# Patient Record
Sex: MALE | Race: BLACK OR AFRICAN AMERICAN | Employment: UNEMPLOYED | URBAN - METROPOLITAN AREA
[De-identification: names, ages, dates, MRNs, and addresses within clinical notes are randomized per-mention and may not be internally consistent; named-entity substitution may affect disease eponyms.]

---

## 2021-05-20 ENCOUNTER — HOSPITAL ENCOUNTER (EMERGENCY)
Age: 41
Discharge: HOME | End: 2021-05-20
Attending: EMERGENCY MEDICINE | Admitting: EMERGENCY MEDICINE

## 2021-05-20 VITALS
OXYGEN SATURATION: 100 % | WEIGHT: 196 LBS | BODY MASS INDEX: 30.76 KG/M2 | RESPIRATION RATE: 18 BRPM | HEIGHT: 67 IN | HEART RATE: 90 BPM | TEMPERATURE: 98.2 F | SYSTOLIC BLOOD PRESSURE: 144 MMHG | DIASTOLIC BLOOD PRESSURE: 72 MMHG

## 2021-05-20 DIAGNOSIS — K08.89 DENTALGIA: ICD-10-CM

## 2021-05-20 DIAGNOSIS — R68.84 JAW PAIN: Primary | ICD-10-CM

## 2021-05-20 PROCEDURE — 99283 EMERGENCY DEPT VISIT LOW MDM: CPT

## 2021-05-20 PROCEDURE — 74011250637 HC RX REV CODE- 250/637: Performed by: EMERGENCY MEDICINE

## 2021-05-20 RX ORDER — OXYCODONE HYDROCHLORIDE 5 MG/1
5 TABLET ORAL
Qty: 15 TABLET | Refills: 0 | Status: SHIPPED | OUTPATIENT
Start: 2021-05-20 | End: 2021-05-24

## 2021-05-20 RX ORDER — OXYCODONE HYDROCHLORIDE 5 MG/1
5 TABLET ORAL
Qty: 15 TABLET | Refills: 0 | Status: SHIPPED | OUTPATIENT
Start: 2021-05-20 | End: 2021-05-20 | Stop reason: SDUPTHER

## 2021-05-20 RX ORDER — OXYCODONE HYDROCHLORIDE 5 MG/1
5 TABLET ORAL
Status: COMPLETED | OUTPATIENT
Start: 2021-05-20 | End: 2021-05-20

## 2021-05-20 RX ADMIN — OXYCODONE HYDROCHLORIDE 5 MG: 5 TABLET ORAL at 17:42

## 2021-05-20 NOTE — ED NOTES
Pt arrived to ED with c/o R lower dental pain x 3 days. Pt is in no acute distress. Will continue to monitor. See nursing assessment. Safety precautions in place; call light within reach. Emergency Department Nursing Plan of Care       The Nursing Plan of Care is developed from the Nursing assessment and Emergency Department Attending provider initial evaluation. The plan of care may be reviewed in the ED Provider note.     The Plan of Care was developed with the following considerations:   Patient / Family readiness to learn indicated by:verbalized understanding  Persons(s) to be included in education: patient  Barriers to Learning/Limitations:Zahida Barclay, RN    5/20/2021   5:40 PM

## 2021-05-20 NOTE — ED PROVIDER NOTES
EMERGENCY DEPARTMENT HISTORY AND PHYSICAL EXAM      Date: 5/20/2021  Patient Name: Janean Schwab    History of Presenting Illness     Chief Complaint   Patient presents with    Dental Pain       History Provided By: Patient    HPI: Janean Schwab, 47 y.o. male presents to the ED with cc of right-sided dental pain. Pain has been present over the past 3 days. He states that he has had problems with this tooth before but has not seen a dentist regarding it. It acutely worsened over the past 3 days and has gotten to the point where he is unable to get any rest as a result. He has tried taking Tylenol, ibuprofen, and his wife who is in the medical field start him on penicillin VK and dental balls, he is not having any relief of symptoms while taking these medications. Denies any fevers, chills, facial swelling, abscess formation, or purulent discharge. He believes this happened after he cracked his tooth. There are no other complaints, changes, or physical findings at this time. PCP: None    No current facility-administered medications on file prior to encounter. No current outpatient medications on file prior to encounter. Past History     Past Medical History:  History reviewed. No pertinent past medical history. Past Surgical History:  History reviewed. No pertinent surgical history. Family History:  History reviewed. No pertinent family history. Social History:  Social History     Tobacco Use    Smoking status: Former Smoker    Smokeless tobacco: Former User   Substance Use Topics    Alcohol use: Not Currently    Drug use: Not Currently       Allergies:  No Known Allergies      Review of Systems   Review of Systems   Constitutional: Negative for chills and fever. HENT: Positive for dental problem. Negative for facial swelling, trouble swallowing and voice change. Eyes: Negative for visual disturbance. Gastrointestinal: Negative for nausea and vomiting.    Skin: Negative for color change and rash. Neurological: Negative for headaches. All other systems reviewed and are negative. Physical Exam   Physical Exam  Vitals and nursing note reviewed. Constitutional:       General: He is not in acute distress. Appearance: Normal appearance. He is not ill-appearing or toxic-appearing. Comments: Appears uncomfortable due to pain   HENT:      Head: Normocephalic and atraumatic. Mouth/Throat:      Comments: Right lower premolar with slight fracture, likely some nerve exposed. There is no associated abscess or purulent drainage. Tooth is tender to percussion. No sign of overt infection. Posterior oropharynx is clear without erythema, edema. Tolerating secretions. Cardiovascular:      Rate and Rhythm: Normal rate and regular rhythm. Pulmonary:      Effort: Pulmonary effort is normal. No respiratory distress. Skin:     General: Skin is warm and dry. Findings: No erythema or rash. Neurological:      General: No focal deficit present. Mental Status: He is alert and oriented to person, place, and time. Diagnostic Study Results     Labs -   No results found for this or any previous visit (from the past 12 hour(s)). Radiologic Studies -   No orders to display     CT Results  (Last 48 hours)    None        CXR Results  (Last 48 hours)    None          Medical Decision Making   I am the first provider for this patient. I reviewed the vital signs, available nursing notes, past medical history, past surgical history, family history and social history. Vital Signs-Reviewed the patient's vital signs.   Visit Vitals  BP (!) 144/72 (BP 1 Location: Left upper arm, BP Patient Position: At rest)   Pulse 90   Temp 98.2 °F (36.8 °C)   Resp 18   Ht 5' 7\" (1.702 m)   Wt 88.9 kg (196 lb)   SpO2 100%   BMI 30.70 kg/m²       Records Reviewed: Nursing Notes    Provider Notes (Medical Decision Making):   71-year-old male here with right lower dental pain after he believes he cracked his tooth. He is afebrile and vital signs are stable. No sign of facial swelling, abscess formation, or purulent drainage. No sign of airway obstruction, or posterior oropharyngeal infection. Offered dental block, however patient is declining. Will treat symptomatically, encouraged use of penicillin VK, Tylenol, ibuprofen, and oxycodone as needed. Given dentistry referral, encourage close return precautions, all questions answered and he agrees with plan as above. ED Course:   Initial assessment performed. The patients presenting problems have been discussed, and they are in agreement with the care plan formulated and outlined with them. I have encouraged them to ask questions as they arise throughout their visit. Discharge Note:  The patient has been re-evaluated and is ready for discharge. Reviewed available results with patient. Counseled patient on diagnosis and care plan. Patient has expressed understanding, and all questions have been answered. Patient agrees with plan and agrees to follow up as recommended, or to return to the ED if their symptoms worsen. Discharge instructions have been provided and explained to the patient, along with reasons to return to the ED. Disposition:  Discharge    DISCHARGE PLAN:  1. Discharge Medication List as of 5/20/2021  5:39 PM      CONTINUE these medications which have CHANGED    Details   oxyCODONE IR (Roxicodone) 5 mg immediate release tablet Take 1 Tablet by mouth every six (6) hours as needed for Pain for up to 4 days. Max Daily Amount: 20 mg., Normal, Disp-15 Tablet, R-0           2. Follow-up Information     Follow up With Specialties Details Why [address] Schedule an appointment as soon as possible for a visit   [address]  [id number] [place] [address] [phone]        3. Return to ED if worse     Diagnosis     Clinical Impression:   1. Jaw pain    2.  Vernida Speck Attestations:  I am the first and primary provider of record for this patient's ED encounter. I personally performed the services described above in this documentation. Wendi Dickey MD    Please note that this dictation was completed with [employer], the computer voice recognition software. Quite often unanticipated grammatical, syntax, homophones, and other interpretive errors are inadvertently transcribed by the computer software. Please disregard these errors. Please excuse any errors that have escaped final proofreading. Thank you.

## 2021-05-20 NOTE — DISCHARGE INSTRUCTIONS
You can use Tylenol 1000 mg every 6 hours as needed for pain, please do not exceed 4000 mg in a single day. You can use ibuprofen 600 mg every 6 hours as needed for pain, please do not exceed 2400 milligrams in a single day. You can use oxycodone every 6 hours as needed for breakthrough pain. Please use heat pads and stretching to help alleviate the pain. It was a pleasure taking care of you in our Emergency Department today. We know that when you come to [address] [place] [address], you are entrusting us with your health, comfort, and safety. Our physicians and nurses honor that trust, and truly appreciate the opportunity to care for you and your loved ones. We also value your feedback. If you receive a survey about your Emergency Department experience today, please fill it out. We care about our patients' feedback, and we listen to what you have to say. Thank you! Dental resources:    Emergency [address]   [address] [place] [address] Ln   Monday, Wednesday, Friday: 8am-5pm   Tuesday and Thursday: 8am-6pm   Phone: [phone]   $70 for Emergency Care   $60 for first routine care, then pay by sliding scale based upon income     [facility] [place] [address]   Phone: [phone], select option (2) to confirm time for treatment     [facility]   [address] [facility]   Monday-Friday: 8am-4pm   Phone: [phone] of Dentistry Urgent [facility], [address] [place] [address], [facility]  [address] [place] [address]   Phone: [phone] to confirm a time for emergency treatment   Pediatrics: [phone]   $75 per tooth, extractions only     [facility]   [address], [address] [place] [address]   Phone [phone] or [phone]   Hours [id number] (extractions)   Simple tooth extraction: $60 per tooth, $55 per x-ray     [facility] (in [place])   [facility] only   Phone: [phone], leave message saying you need an appointment to register   Hours: Wed 6-9p       Non-Urgent [place] [facility] [facility] at [address] [place] [address]  [place], [place], [place]   Dental Clinic: [phone]   Oral Surgery Clinic: [phone]

## 2021-05-20 NOTE — ED TRIAGE NOTES
C\o right lower dental pain x3 days. Pt cracked his tooth and has had pain since. Pt was taking his left over pcn and has dental balls without relief, pt was also taking Toradol.

## 2021-05-20 NOTE — ED NOTES
Discharge instructions were given to the patient by Richard Rondon RN. The patient left the Emergency Department ambulatory, alert and oriented and in no acute distress with 1 prescriptions. The patient was encouraged to call or return to the ED for worsening issues or problems and was encouraged to schedule a follow up appointment for continuing care. The patient verbalized understanding of discharge instructions and prescriptions, all questions were answered. The patient has no further concerns at this time.